# Patient Record
Sex: FEMALE | Race: WHITE | HISPANIC OR LATINO
[De-identification: names, ages, dates, MRNs, and addresses within clinical notes are randomized per-mention and may not be internally consistent; named-entity substitution may affect disease eponyms.]

---

## 2021-08-18 ENCOUNTER — NON-APPOINTMENT (OUTPATIENT)
Age: 46
End: 2021-08-18

## 2021-08-18 PROBLEM — Z00.00 ENCOUNTER FOR PREVENTIVE HEALTH EXAMINATION: Status: ACTIVE | Noted: 2021-08-18

## 2021-08-23 ENCOUNTER — APPOINTMENT (OUTPATIENT)
Dept: PLASTIC SURGERY | Facility: CLINIC | Age: 46
End: 2021-08-23
Payer: SELF-PAY

## 2021-08-23 ENCOUNTER — NON-APPOINTMENT (OUTPATIENT)
Age: 46
End: 2021-08-23

## 2021-08-23 VITALS — HEIGHT: 67 IN | OXYGEN SATURATION: 98 % | HEART RATE: 92 BPM | WEIGHT: 184 LBS | BODY MASS INDEX: 28.88 KG/M2

## 2021-08-23 DIAGNOSIS — Z86.2 PERSONAL HISTORY OF DISEASES OF THE BLOOD AND BLOOD-FORMING ORGANS AND CERTAIN DISORDERS INVOLVING THE IMMUNE MECHANISM: ICD-10-CM

## 2021-08-23 DIAGNOSIS — D68.0 VON WILLEBRAND'S DISEASE: ICD-10-CM

## 2021-08-23 DIAGNOSIS — Z87.19 PERSONAL HISTORY OF OTHER DISEASES OF THE DIGESTIVE SYSTEM: ICD-10-CM

## 2021-08-23 DIAGNOSIS — F17.200 NICOTINE DEPENDENCE, UNSPECIFIED, UNCOMPLICATED: ICD-10-CM

## 2021-08-23 DIAGNOSIS — Z41.1 ENCOUNTER FOR COSMETIC SURGERY: ICD-10-CM

## 2021-08-23 PROCEDURE — 99499 UNLISTED E&M SERVICE: CPT

## 2021-08-23 NOTE — HISTORY OF PRESENT ILLNESS
[FreeTextEntry1] : 45 y/o F presents for initial consultation regarding abdominoplasty and breast lift. She has a history of 50 lb weight loss and would like to continue to lose more weight, about 10-15 lbs. She is currently a 38 DD cup size.\par \par Of note she has a hx of anemia and Von Willebrand disease for which she sees a hematologist regularly. She gets iron infusions and previously was given DDAVP preop with other surgery.\par \par SHx: She is a current everyday cigarette smoker, she has been smoking for about 30 years.\par \par PE:\par breasts: grade II ptosis, no nipple discharge, no skin changes, no nipple inversion \par abdomen: NT, ND, no masses, +skin laxity, +adipose tissue, well healed laparoscopic scars x 8, well healed inferior periumbilical hernia scar\par \par Today we discussed all options for abdominoplasty and bilateral mastopexy as a combined procedure. The nature of each surgery, its risks, benefits, alternatives, expected postoperative course, recovery and long term results were discussed in detail. All questions were answered. \par She will need to get heme clearance. \par Will coordinate potential surgery for the near future.

## 2024-11-15 ENCOUNTER — OFFICE (OUTPATIENT)
Facility: LOCATION | Age: 49
Setting detail: OPHTHALMOLOGY
End: 2024-11-15
Payer: MEDICAID

## 2024-11-15 ENCOUNTER — RX ONLY (RX ONLY)
Age: 49
End: 2024-11-15

## 2024-11-15 DIAGNOSIS — H11.32: ICD-10-CM

## 2024-11-15 PROCEDURE — 92004 COMPRE OPH EXAM NEW PT 1/>: CPT | Performed by: OPHTHALMOLOGY

## 2024-11-15 ASSESSMENT — REFRACTION_MANIFEST
OS_VA1: 20/20
OD_AXIS: 15
OD_VA1: 20/30
OD_SPHERE: -1.25
OD_CYLINDER: +1.00
OS_SPHERE: -1.00
OS_AXIS: 151
OS_CYLINDER: +0.50

## 2024-11-15 ASSESSMENT — TONOMETRY
OS_IOP_MMHG: 13
OD_IOP_MMHG: 13

## 2024-11-15 ASSESSMENT — REFRACTION_AUTOREFRACTION
OD_AXIS: 15
OS_AXIS: 151
OD_SPHERE: -1.25
OS_SPHERE: -1.00
OS_CYLINDER: +0.50
OD_CYLINDER: +1.00

## 2024-11-15 ASSESSMENT — VISUAL ACUITY
OS_BCVA: 20/50-2
OD_BCVA: 20/30

## 2024-11-15 ASSESSMENT — CONFRONTATIONAL VISUAL FIELD TEST (CVF)
OS_FINDINGS: FULL
OD_FINDINGS: FULL